# Patient Record
Sex: MALE | Race: WHITE | Employment: UNEMPLOYED | URBAN - METROPOLITAN AREA
[De-identification: names, ages, dates, MRNs, and addresses within clinical notes are randomized per-mention and may not be internally consistent; named-entity substitution may affect disease eponyms.]

---

## 2020-09-02 ENCOUNTER — HOSPITAL ENCOUNTER (OUTPATIENT)
Facility: CLINIC | Age: 45
Setting detail: OBSERVATION
Discharge: HOME | End: 2020-09-03
Attending: EMERGENCY MEDICINE | Admitting: EMERGENCY MEDICINE
Payer: COMMERCIAL

## 2020-09-02 DIAGNOSIS — L08.9 SKIN INFECTION: Primary | ICD-10-CM

## 2020-09-02 DIAGNOSIS — I89.1 LYMPHANGITIS: ICD-10-CM

## 2020-09-02 LAB
ANION GAP SERPL CALCULATED.3IONS-SCNC: 8 MMOL/L (ref 3–14)
BASOPHILS # BLD AUTO: 0 10E9/L (ref 0–0.2)
BASOPHILS NFR BLD AUTO: 0.5 %
BUN SERPL-MCNC: 12 MG/DL (ref 7–30)
CALCIUM SERPL-MCNC: 8.7 MG/DL (ref 8.5–10.1)
CHLORIDE SERPL-SCNC: 104 MMOL/L (ref 94–109)
CO2 SERPL-SCNC: 27 MMOL/L (ref 20–32)
CREAT SERPL-MCNC: 0.7 MG/DL (ref 0.66–1.25)
CRP SERPL-MCNC: 11 MG/L (ref 0–8)
DIFFERENTIAL METHOD BLD: ABNORMAL
EOSINOPHIL # BLD AUTO: 0 10E9/L (ref 0–0.7)
EOSINOPHIL NFR BLD AUTO: 0.4 %
ERYTHROCYTE [DISTWIDTH] IN BLOOD BY AUTOMATED COUNT: 13.6 % (ref 10–15)
ERYTHROCYTE [SEDIMENTATION RATE] IN BLOOD BY WESTERGREN METHOD: 34 MM/H (ref 0–15)
GFR SERPL CREATININE-BSD FRML MDRD: >90 ML/MIN/{1.73_M2}
GLUCOSE SERPL-MCNC: 97 MG/DL (ref 70–99)
HCT VFR BLD AUTO: 37.4 % (ref 40–53)
HGB BLD-MCNC: 12.6 G/DL (ref 13.3–17.7)
IMM GRANULOCYTES # BLD: 0 10E9/L (ref 0–0.4)
IMM GRANULOCYTES NFR BLD: 0.4 %
LYMPHOCYTES # BLD AUTO: 1.7 10E9/L (ref 0.8–5.3)
LYMPHOCYTES NFR BLD AUTO: 21.9 %
MAGNESIUM SERPL-MCNC: 2.2 MG/DL (ref 1.6–2.3)
MCH RBC QN AUTO: 31 PG (ref 26.5–33)
MCHC RBC AUTO-ENTMCNC: 33.7 G/DL (ref 31.5–36.5)
MCV RBC AUTO: 92 FL (ref 78–100)
MONOCYTES # BLD AUTO: 0.6 10E9/L (ref 0–1.3)
MONOCYTES NFR BLD AUTO: 7.7 %
NEUTROPHILS # BLD AUTO: 5.4 10E9/L (ref 1.6–8.3)
NEUTROPHILS NFR BLD AUTO: 69.1 %
NRBC # BLD AUTO: 0 10*3/UL
NRBC BLD AUTO-RTO: 0 /100
PLATELET # BLD AUTO: 259 10E9/L (ref 150–450)
POTASSIUM SERPL-SCNC: 3.3 MMOL/L (ref 3.4–5.3)
RBC # BLD AUTO: 4.06 10E12/L (ref 4.4–5.9)
SODIUM SERPL-SCNC: 139 MMOL/L (ref 133–144)
WBC # BLD AUTO: 7.8 10E9/L (ref 4–11)

## 2020-09-02 PROCEDURE — 99219 ZZC INITIAL OBSERVATION CARE,LEVL II: CPT | Mod: Z6 | Performed by: PHYSICIAN ASSISTANT

## 2020-09-02 PROCEDURE — 25800030 ZZH RX IP 258 OP 636: Performed by: EMERGENCY MEDICINE

## 2020-09-02 PROCEDURE — 99285 EMERGENCY DEPT VISIT HI MDM: CPT | Mod: 25 | Performed by: EMERGENCY MEDICINE

## 2020-09-02 PROCEDURE — U0003 INFECTIOUS AGENT DETECTION BY NUCLEIC ACID (DNA OR RNA); SEVERE ACUTE RESPIRATORY SYNDROME CORONAVIRUS 2 (SARS-COV-2) (CORONAVIRUS DISEASE [COVID-19]), AMPLIFIED PROBE TECHNIQUE, MAKING USE OF HIGH THROUGHPUT TECHNOLOGIES AS DESCRIBED BY CMS-2020-01-R: HCPCS | Performed by: EMERGENCY MEDICINE

## 2020-09-02 PROCEDURE — 25000128 H RX IP 250 OP 636: Performed by: EMERGENCY MEDICINE

## 2020-09-02 PROCEDURE — C9803 HOPD COVID-19 SPEC COLLECT: HCPCS | Performed by: EMERGENCY MEDICINE

## 2020-09-02 PROCEDURE — 85652 RBC SED RATE AUTOMATED: CPT | Performed by: EMERGENCY MEDICINE

## 2020-09-02 PROCEDURE — 87040 BLOOD CULTURE FOR BACTERIA: CPT | Mod: XS | Performed by: EMERGENCY MEDICINE

## 2020-09-02 PROCEDURE — 80048 BASIC METABOLIC PNL TOTAL CA: CPT | Performed by: EMERGENCY MEDICINE

## 2020-09-02 PROCEDURE — 25000132 ZZH RX MED GY IP 250 OP 250 PS 637: Performed by: NURSE PRACTITIONER

## 2020-09-02 PROCEDURE — G0378 HOSPITAL OBSERVATION PER HR: HCPCS

## 2020-09-02 PROCEDURE — 96365 THER/PROPH/DIAG IV INF INIT: CPT | Performed by: EMERGENCY MEDICINE

## 2020-09-02 PROCEDURE — 86140 C-REACTIVE PROTEIN: CPT | Performed by: EMERGENCY MEDICINE

## 2020-09-02 PROCEDURE — 83735 ASSAY OF MAGNESIUM: CPT | Performed by: EMERGENCY MEDICINE

## 2020-09-02 PROCEDURE — 96366 THER/PROPH/DIAG IV INF ADDON: CPT | Performed by: EMERGENCY MEDICINE

## 2020-09-02 PROCEDURE — 85025 COMPLETE CBC W/AUTO DIFF WBC: CPT | Performed by: EMERGENCY MEDICINE

## 2020-09-02 RX ORDER — POTASSIUM CHLORIDE 1.5 G/1.58G
20-40 POWDER, FOR SOLUTION ORAL
Status: DISCONTINUED | OUTPATIENT
Start: 2020-09-02 | End: 2020-09-03 | Stop reason: HOSPADM

## 2020-09-02 RX ORDER — BUPROPION HYDROCHLORIDE 150 MG/1
150 TABLET, EXTENDED RELEASE ORAL 2 TIMES DAILY
Status: DISCONTINUED | OUTPATIENT
Start: 2020-09-02 | End: 2020-09-02

## 2020-09-02 RX ORDER — ONDANSETRON 4 MG/1
4 TABLET, ORALLY DISINTEGRATING ORAL EVERY 6 HOURS PRN
Status: DISCONTINUED | OUTPATIENT
Start: 2020-09-02 | End: 2020-09-03 | Stop reason: HOSPADM

## 2020-09-02 RX ORDER — CEFAZOLIN SODIUM 1 G/50ML
1250 SOLUTION INTRAVENOUS ONCE
Status: COMPLETED | OUTPATIENT
Start: 2020-09-02 | End: 2020-09-02

## 2020-09-02 RX ORDER — POTASSIUM CHLORIDE 7.45 MG/ML
10 INJECTION INTRAVENOUS
Status: DISCONTINUED | OUTPATIENT
Start: 2020-09-02 | End: 2020-09-03 | Stop reason: HOSPADM

## 2020-09-02 RX ORDER — MAGNESIUM SULFATE HEPTAHYDRATE 40 MG/ML
4 INJECTION, SOLUTION INTRAVENOUS EVERY 4 HOURS PRN
Status: DISCONTINUED | OUTPATIENT
Start: 2020-09-02 | End: 2020-09-03 | Stop reason: HOSPADM

## 2020-09-02 RX ORDER — POTASSIUM CL/LIDO/0.9 % NACL 10MEQ/0.1L
10 INTRAVENOUS SOLUTION, PIGGYBACK (ML) INTRAVENOUS
Status: DISCONTINUED | OUTPATIENT
Start: 2020-09-02 | End: 2020-09-03 | Stop reason: HOSPADM

## 2020-09-02 RX ORDER — ACETAMINOPHEN 325 MG/1
650 TABLET ORAL EVERY 4 HOURS PRN
Status: DISCONTINUED | OUTPATIENT
Start: 2020-09-02 | End: 2020-09-03 | Stop reason: HOSPADM

## 2020-09-02 RX ORDER — POTASSIUM CHLORIDE 750 MG/1
20-40 TABLET, EXTENDED RELEASE ORAL
Status: DISCONTINUED | OUTPATIENT
Start: 2020-09-02 | End: 2020-09-03 | Stop reason: HOSPADM

## 2020-09-02 RX ORDER — ONDANSETRON 2 MG/ML
4 INJECTION INTRAMUSCULAR; INTRAVENOUS EVERY 6 HOURS PRN
Status: DISCONTINUED | OUTPATIENT
Start: 2020-09-02 | End: 2020-09-03 | Stop reason: HOSPADM

## 2020-09-02 RX ORDER — POTASSIUM CHLORIDE 29.8 MG/ML
20 INJECTION INTRAVENOUS
Status: DISCONTINUED | OUTPATIENT
Start: 2020-09-02 | End: 2020-09-03 | Stop reason: HOSPADM

## 2020-09-02 RX ORDER — NALOXONE HYDROCHLORIDE 0.4 MG/ML
.1-.4 INJECTION, SOLUTION INTRAMUSCULAR; INTRAVENOUS; SUBCUTANEOUS
Status: DISCONTINUED | OUTPATIENT
Start: 2020-09-02 | End: 2020-09-03 | Stop reason: HOSPADM

## 2020-09-02 RX ADMIN — POTASSIUM CHLORIDE 20 MEQ: 1.5 POWDER, FOR SOLUTION ORAL at 23:37

## 2020-09-02 RX ADMIN — VANCOMYCIN HYDROCHLORIDE 1250 MG: 1 INJECTION, POWDER, LYOPHILIZED, FOR SOLUTION INTRAVENOUS at 14:49

## 2020-09-02 ASSESSMENT — ENCOUNTER SYMPTOMS
SORE THROAT: 0
FEVER: 0
SHORTNESS OF BREATH: 0
COUGH: 0
WOUND: 1

## 2020-09-02 NOTE — PHARMACY-VANCOMYCIN DOSING SERVICE
Pharmacy Vancomycin Initial Note  Date of Service 2020  Patient's  1975  44 year old, male    Indication: Skin and Soft Tissue Infection    Current estimated CrCl = 151.2 ml/min    Creatinine for last 3 days  2020: 12:44 PM Creatinine 0.70 mg/dL    Recent Vancomycin Level(s) for last 3 days  No results found for requested labs within last 72 hours.      Vancomycin IV Administrations (past 72 hours)      No vancomycin orders with administrations in past 72 hours.                Nephrotoxins and other renal medications (From now, onward)    Start     Dose/Rate Route Frequency Ordered Stop    20 1425  vancomycin (VANCOCIN) 1,250 mg in sodium chloride 0.9 % 250 mL intermittent infusion      1,250 mg  over 90 Minutes Intravenous ONCE 20 1424            Contrast Orders - past 72 hours (72h ago, onward)    None                Plan:  1. Start vancomycin  1250 mg IV x1 in ED (~15.7 mg/kg).   2. Goal Trough Level: 10-15 mg/L   3.  Pharmacy will check trough levels as appropriate in 1-3 Days.    4. Serum creatinine levels will be ordered a minimum of twice weekly.    5. [place] method utilized to dose vancomycin therapy: Method 2    Harry Kemp, Isaac  [facility]  Emergency Department: Ascom *[id number]

## 2020-09-02 NOTE — ED NOTES
Report given to Mayela on Obs unit. Pt and family informed of Observation status. Pt vitally stable and ready for transfer.

## 2020-09-02 NOTE — ED NOTES
Observation Brochure and Video    Patient and family informed of observation status based on provider's order.  Observation brochure was given and the video watched. Patient/Family stated understanding. Questions answered.  Rebecca Fisher RN

## 2020-09-02 NOTE — ED PROVIDER NOTES
ED Provider Note  [facility]      History     Chief Complaint   Patient presents with     Hand Pain     Patient noticed a couple small bumps on his R hand 5 days ago, patient also noticed new swelling and redness on his Right hand 3 days ago , pateint concern for possible infection.      The history is provided by the patient and medical records.     Heber Wells is a 44 year old male with no significant past medical history who presents here to the Emergency Department for evaluation of hand pain.  Patient states he noticed a couple bumps on his right hand 5 days ago.  He notes the nodules are extremely painful and getting progressively worse.  Patient states the first nodule was on his right thumb.  He describes they progress with draining pus then crusting over.  He notes new swelling and redness on his right hand that goes up his forearm 3 days ago.  Patient reports additional painful nodules on his left hand.  He notes that each nodule started with a trauma/scratch. Patient notes a history of carpal tunnel syndrome.  He denies history of IV drug use, patient has no artificial valves or other cardiac history.  He states he was sick 2-3 weeks ago with myalgias.  Patient denies any COVID symptoms. Afebrile currently.  No joint stiffness or history of RA.       Review of Systems   Constitutional: Negative for fever.   HENT: Negative for sore throat.    Respiratory: Negative for cough and shortness of breath.    Cardiovascular: Negative for chest pain.   Skin: Positive for wound (painful nodules on digits and hand).     A complete review of systems was performed with pertinent positives and negatives noted in the HPI, and all other systems negative.    Physical Exam   BP: 127/79  Pulse: 93  Temp: 99  F (37.2  C)  Resp: 16  Weight: 79.4 kg (175 lb)  SpO2: 98 %  Physical Exam  General: awake, alert, NAD  Head: normal cephalic  HEENT: pupils equal, conjugate gaze in tact  Neck:  Supple  CV: regular rate and rhythm without murmur  Lungs: clear to ascultation  Abd: soft, non-tender, no guarding, no peritoneal signs  EXT: lower extremities without swelling or edema.    Neuro: awake, answers questions appropriately. No focal deficits noted   Skin: Nodule on L middle digit, several crusted nodules noted bilateral digits.  2 firm, tender, nodules with redness on right palm, streaks of erythema tracking up the forearm concerning for lymphangitis    ED Course     12:12 PM  The patient was seen and examined by Rudy Joseph MD in Room ED19.    Procedures       Results for orders placed or performed during the hospital encounter of 09/02/20   CBC with platelets differential     Status: Abnormal   Result Value Ref Range    WBC 7.8 4.0 - 11.0 10e9/L    RBC Count 4.06 (L) 4.4 - 5.9 10e12/L    Hemoglobin 12.6 (L) 13.3 - 17.7 g/dL    Hematocrit 37.4 (L) 40.0 - 53.0 %    MCV 92 78 - 100 fl    MCH 31.0 26.5 - 33.0 pg    MCHC 33.7 31.5 - 36.5 g/dL    RDW 13.6 10.0 - 15.0 %    Platelet Count 259 150 - 450 10e9/L    Diff Method Automated Method     % Neutrophils 69.1 %    % Lymphocytes 21.9 %    % Monocytes 7.7 %    % Eosinophils 0.4 %    % Basophils 0.5 %    % Immature Granulocytes 0.4 %    Nucleated RBCs 0 0 /100    Absolute Neutrophil 5.4 1.6 - 8.3 10e9/L    Absolute Lymphocytes 1.7 0.8 - 5.3 10e9/L    Absolute Monocytes 0.6 0.0 - 1.3 10e9/L    Absolute Eosinophils 0.0 0.0 - 0.7 10e9/L    Absolute Basophils 0.0 0.0 - 0.2 10e9/L    Abs Immature Granulocytes 0.0 0 - 0.4 10e9/L    Absolute Nucleated RBC 0.0    Basic metabolic panel     Status: Abnormal   Result Value Ref Range    Sodium 139 133 - 144 mmol/L    Potassium 3.3 (L) 3.4 - 5.3 mmol/L    Chloride 104 94 - 109 mmol/L    Carbon Dioxide 27 20 - 32 mmol/L    Anion Gap 8 3 - 14 mmol/L    Glucose 97 70 - 99 mg/dL    Urea Nitrogen 12 7 - 30 mg/dL    Creatinine 0.70 0.66 - 1.25 mg/dL    GFR Estimate >90 >60 mL/min/[1.73_m2]    GFR Estimate If Black >90 >60  mL/min/[1.73_m2]    Calcium 8.7 8.5 - 10.1 mg/dL   CRP inflammation     Status: Abnormal   Result Value Ref Range    CRP Inflammation 11.0 (H) 0.0 - 8.0 mg/L   Erythrocyte sedimentation rate auto     Status: Abnormal   Result Value Ref Range    Sed Rate 34 (H) 0 - 15 mm/h     Medications   vancomycin (VANCOCIN) 1,250 mg in sodium chloride 0.9 % 250 mL intermittent infusion (has no administration in time range)        Assessments & Plan (with Medical Decision Making)   Patient is awake, alert, nontoxic appearing.  Patient has some type of nodule skin condition on his bilateral hands, varying stages of healing.  These lesions do not appear to be necrotic or consistent with Janeway nodules.  Additionally patient does not have obvious risk factors for endocarditis.  The 2 nodules on his palmar aspect have overlying cracks in the skin with erythema streaking up the forearm, concerning for lymphangitic spread of an infection.  I suspect secondary infection and not infection is the underlying primary source.  Will obtain laboratory studies and blood cultures.    Laboratory studies notable for normal white count, no left shift.  Mildly elevated sed rate and ESR.  Given the concern for the lymphangitic spread I will give a dose of IV vancomycin, would anticipate patient can be transitioned over to doxycycline once the lymphangitis is no longer spreading.  Plan is to admit with ED observation, can obtain a dermatology consult and assess for improvement of lymphangitic spread.    I have reviewed the nursing notes. I have reviewed the findings, diagnosis, plan and need for follow up with the patient.    New Prescriptions    No medications on file       Final diagnoses:   Lymphangitis   IGabriela, am serving as a trained medical scribe to document services personally performed by Rudy Riley MD, based on the provider's statements to me.     IRudy MD, was physically present and have reviewed and verified  the accuracy of this note documented by Gabriela Rogers.     --  Rudy Joseph MD  [facility], [place], EMERGENCY DEPARTMENT  9/2/2020     Rudy Joseph MD  09/02/20 1423

## 2020-09-02 NOTE — PHARMACY-VANCOMYCIN DOSING SERVICE
Pharmacy Vancomycin Initial Note  Date of Service 2020  Patient's  1975  44 year old, male    Indication: Skin and Soft Tissue Infection    Current estimated CrCl = Estimated Creatinine Clearance: 151.2 mL/min (based on SCr of 0.7 mg/dL).    Creatinine for last 3 days  2020: 12:44 PM Creatinine 0.70 mg/dL    Recent Vancomycin Level(s) for last 3 days  No results found for requested labs within last 72 hours.      Vancomycin IV Administrations (past 72 hours)                   vancomycin (VANCOCIN) 1,250 mg in sodium chloride 0.9 % 250 mL intermittent infusion (mg) 1,250 mg New Bag 20 1449                Nephrotoxins and other renal medications (From now, onward)    Start     Dose/Rate Route Frequency Ordered Stop    20 0300  vancomycin 1250 mg in 0.9% NaCl 250 mL intermittent infusion 1,250 mg      1,250 mg  over 90 Minutes Intravenous EVERY 12 HOURS 20 1819            Contrast Orders - past 72 hours (72h ago, onward)    None                Plan:  1.  Start vancomycin  1250 mg IV q12h.   2.  Goal Trough Level: 10-15 mg/L   3.  Pharmacy will check trough levels as appropriate in 1-3 Days.    4. Serum creatinine levels will be ordered daily for the first week of therapy and at least twice weekly for subsequent weeks.    5. [place] method utilized to dose vancomycin therapy: Method 2

## 2020-09-02 NOTE — H&P
[facility] ED Observation Admission Note    Chief Complaint   Patient presents with     Hand Pain     Patient noticed a couple small bumps on his R hand 5 days ago, patient also noticed new swelling and redness on his Right hand 3 days ago , pateint concern for possible infection.        Assessment/Plan:  Heber Wells is a 44 year old male with no significant past medical history who presents here to the Emergency Department for evaluation of hand pain.    1. Skin infection: noticed 2 small bumps on his right thumb about 6 days ago. The following day, he noticed few more bumps on his right palm as well as few others on the left hand. He reports they drain pus then crust over. The nodules are very painful.  Now has red streaks going up to the right forearm. He also has associated swellin. Denies fever or chills. Denies history of IV drug use. In the ED,  VSS. LAB: BMP remarkable for low potassium ( K3.3) otherwise normal. No leukocytosis. HGB 12.6, at baseline. BC x 2 pending. COVID19 pending. Vancomycin was started in the ED. Patient admitted for IV antibiotic and dermatology consult.   -VS per routine  - ml/ hour  -Pharmacy to dose Vancomycin, can transition to Doxycycline per pharmacy  -Dermatology IP consult   -Follow Covid19  -Follow BC         HPI:    Heber Wells is a 44 year old male with no significant past medical history who presents here to the Emergency Department for evaluation of hand pain.  Patient states he noticed a couple bumps on his right hand 5 days ago.  He notes the nodules are extremely painful and getting progressively worse.  Patient states the first nodule was on his right thumb.  He describes they progress with draining pus then crusting over.  He notes new swelling and redness on his right hand that goes up his forearm 3 days ago.  Patient reports additional painful nodules on his left hand.  He notes that each nodule started with a trauma/scratch. Patient notes a history of  carpal tunnel syndrome.  He denies history of IV drug use, patient has no artificial valves or other cardiac history.  He states he was sick 2-3 weeks ago with myalgias.  Patient denies any COVID symptoms. Afebrile currently.  No joint stiffness or history of RA.        In the ED,  VSS. LAB: BMP remarkable for low potassium ( K3.3) otherwise normal. No leukocytosis. HGB 12.6, at baseline. BC x 2 pending. COVID19 pending. Vancomycin was started in the ED. Patient admitted for IV antibiotic and dermatology consult.   -VS per routine    On admission to the observation unit the patient was stable    History:    Past Medical History:   Diagnosis Date     Adjustment disorder with anxiety 2013     Moderate major depression (H) 2013       History reviewed. No pertinent surgical history.    Family History   Problem Relation Age of Onset     Diabetes Father      Cancer Father         Leukemia     Cancer Maternal Grandfather         prostate     Heart Disease Sister         barillas       Social History     Socioeconomic History     Marital status: Single     Spouse name: Not on file     Number of children: Not on file     Years of education: Not on file     Highest education level: Not on file   Occupational History     Not on file   Social Needs     Financial resource strain: Not on file     Food insecurity     Worry: Not on file     Inability: Not on file     Transportation needs     Medical: Not on file     Non-medical: Not on file   Tobacco Use     Smoking status: Former Smoker     Types: Cigarettes     Last attempt to quit: 2014     Years since quittin.3     Smokeless tobacco: Never Used   Substance and Sexual Activity     Alcohol use: No     Alcohol/week: 0.0 standard drinks     Drug use: Yes     Types: Marijuana     Comment: occasionally     Sexual activity: Never   Lifestyle     Physical activity     Days per week: Not on file     Minutes per session: Not on file     Stress: Not on file   Relationships      Social connections     Talks on phone: Not on file     Gets together: Not on file     Attends Christian service: Not on file     Active member of club or organization: Not on file     Attends meetings of clubs or organizations: Not on file     Relationship status: Not on file     Intimate partner violence     Fear of current or ex partner: Not on file     Emotionally abused: Not on file     Physically abused: Not on file     Forced sexual activity: Not on file   Other Topics Concern     Parent/sibling w/ CABG, MI or angioplasty before 65F 55M? No   Social History Narrative     Not on file       No current facility-administered medications on file prior to encounter.   buPROPion (WELLBUTRIN SR) 150 MG 12 hr tablet, Take 1 tablet once daily and increase to 1 tablet twice daily after 4 to 7 days  sildenafil (VIAGRA) 100 MG tablet, Take 0.5-1 tablets ( mg) by mouth daily as needed for erectile dysfunction Take 30 min to 4 hours before intercourse.  Never use with nitroglycerin, terazosin or doxazosin.  traMADol (ULTRAM) 50 MG tablet, Take 1 tablet (50 mg) by mouth every 6 hours as needed for moderate pain  traMADol (ULTRAM) 50 MG tablet, Take 1 tablet (50 mg) by mouth every 6 hours as needed for pain        Data:    Results for orders placed or performed during the hospital encounter of 09/02/20   CBC with platelets differential     Status: Abnormal   Result Value Ref Range    WBC 7.8 4.0 - 11.0 10e9/L    RBC Count 4.06 (L) 4.4 - 5.9 10e12/L    Hemoglobin 12.6 (L) 13.3 - 17.7 g/dL    Hematocrit 37.4 (L) 40.0 - 53.0 %    MCV 92 78 - 100 fl    MCH 31.0 26.5 - 33.0 pg    MCHC 33.7 31.5 - 36.5 g/dL    RDW 13.6 10.0 - 15.0 %    Platelet Count 259 150 - 450 10e9/L    Diff Method Automated Method     % Neutrophils 69.1 %    % Lymphocytes 21.9 %    % Monocytes 7.7 %    % Eosinophils 0.4 %    % Basophils 0.5 %    % Immature Granulocytes 0.4 %    Nucleated RBCs 0 0 /100    Absolute Neutrophil 5.4 1.6 - 8.3 10e9/L     Absolute Lymphocytes 1.7 0.8 - 5.3 10e9/L    Absolute Monocytes 0.6 0.0 - 1.3 10e9/L    Absolute Eosinophils 0.0 0.0 - 0.7 10e9/L    Absolute Basophils 0.0 0.0 - 0.2 10e9/L    Abs Immature Granulocytes 0.0 0 - 0.4 10e9/L    Absolute Nucleated RBC 0.0    Basic metabolic panel     Status: Abnormal   Result Value Ref Range    Sodium 139 133 - 144 mmol/L    Potassium 3.3 (L) 3.4 - 5.3 mmol/L    Chloride 104 94 - 109 mmol/L    Carbon Dioxide 27 20 - 32 mmol/L    Anion Gap 8 3 - 14 mmol/L    Glucose 97 70 - 99 mg/dL    Urea Nitrogen 12 7 - 30 mg/dL    Creatinine 0.70 0.66 - 1.25 mg/dL    GFR Estimate >90 >60 mL/min/[1.73_m2]    GFR Estimate If Black >90 >60 mL/min/[1.73_m2]    Calcium 8.7 8.5 - 10.1 mg/dL   CRP inflammation     Status: Abnormal   Result Value Ref Range    CRP Inflammation 11.0 (H) 0.0 - 8.0 mg/L   Erythrocyte sedimentation rate auto     Status: Abnormal   Result Value Ref Range    Sed Rate 34 (H) 0 - 15 mm/h             EKG Interpretation:          ROS:  REVIEW OF SYSTEMS:   General: fatigue   EYES: Negative for vision changes or eye problems   ENT: No problems with ears, nose or throat. No difficulty swallowing.   RESP: No coughing, wheezing or shortness of breath   CV: No chest pains or palpitations   GI: No nausea, vomiting, heartburn, abdominal pain, diarrhea, constipation or change in bowel habits   : No urinary frequency or dysuria, bladder or kidney problems   MUSCULOSKELETAL: No significant muscle or joint pains   NEUROLOGIC: No headaches, numbness, tingling, weakness, problems with balance or coordination   PSYCHIATRIC: No problems with anxiety, depression or mental health   HEME/IMMUNE/ALLERGY: No history of bleeding or clotting problems or anemia. No allergies or immune system problems   ENDOCRINE: No history of thyroid disease, diabetes or other endocrine disorders   SKIN: skin nodules       PCP: Jake Romero  CARDIAC RISK:    10 point ROS negative other than the symptoms noted  above.      Exam:    Vitals:  B/P: 136/101, T: 99.7, P: 87, R: 17    Physical Exam   Constitutional: Pt is oriented to person, place, and time.Pt appears well-developed and well-nourished.   HENT:   Head: Normocephalic and atraumatic.   Eyes: Conjunctivae are normal. Pupils are equal, round, and reactive to light.   Neck: Normal range of motion. Neck supple.   Cardiovascular: Normal rate, regular rhythm, normal heart sounds and intact distal pulses.    Pulmonary/Chest: Effort normal and breath sounds normal. No respiratory distress. Pt has no wheezes. Pt has no rales  Abdominal: Soft. Bowel sounds are normal. Pt exhibits no distension and no mass. No tenderness. Pt has no rebound and no guarding.   Musculoskeletal: Normal range of motion. Pt exhibits no edema.   Neurological: Pt is alert and oriented to person, place, and time. Normal reflexes.   Skin: 2 1cm nodules on the right palm. Tender to the touch. Right swelling and erythema present. Red streaks going to the right forearm.        Consults: Dermatology   FEN: Regular   DVT prophylaxis: Early ambulation  Code Status: Full  Disposition: Stable vital signs. Patient return to baseline.  All labs/images reviewed    Signed:  Monserrat Hurd PA-C  September 2, 2020 at 6:19 PM      Patient was seen and evaluated by ER Staff during the OBS Unit stay (see separate note).  The medical decision making and plan of care was discussed with the OBS Care Team and was supervised by ER Staff.  The documentation above accurately reflects the patient's evaluation, care and disposition under my supervision in the OBS Unit.    Zack Aldridge MD, FACEP  [facility] Staff Emergency Physician

## 2020-09-03 VITALS
TEMPERATURE: 99.7 F | BODY MASS INDEX: 22.46 KG/M2 | SYSTOLIC BLOOD PRESSURE: 141 MMHG | OXYGEN SATURATION: 100 % | DIASTOLIC BLOOD PRESSURE: 92 MMHG | RESPIRATION RATE: 18 BRPM | HEART RATE: 82 BPM | HEIGHT: 74 IN | WEIGHT: 175 LBS

## 2020-09-03 LAB
ANION GAP SERPL CALCULATED.3IONS-SCNC: 5 MMOL/L (ref 3–14)
BUN SERPL-MCNC: 8 MG/DL (ref 7–30)
CALCIUM SERPL-MCNC: 8.7 MG/DL (ref 8.5–10.1)
CHLORIDE SERPL-SCNC: 103 MMOL/L (ref 94–109)
CO2 SERPL-SCNC: 28 MMOL/L (ref 20–32)
CREAT SERPL-MCNC: 0.73 MG/DL (ref 0.66–1.25)
ERYTHROCYTE [DISTWIDTH] IN BLOOD BY AUTOMATED COUNT: 13.9 % (ref 10–15)
GFR SERPL CREATININE-BSD FRML MDRD: >90 ML/MIN/{1.73_M2}
GLUCOSE SERPL-MCNC: 101 MG/DL (ref 70–99)
GRAM STN SPEC: ABNORMAL
GRAM STN SPEC: ABNORMAL
HCT VFR BLD AUTO: 41.4 % (ref 40–53)
HGB BLD-MCNC: 13.9 G/DL (ref 13.3–17.7)
Lab: ABNORMAL
MCH RBC QN AUTO: 31 PG (ref 26.5–33)
MCHC RBC AUTO-ENTMCNC: 33.6 G/DL (ref 31.5–36.5)
MCV RBC AUTO: 92 FL (ref 78–100)
PLATELET # BLD AUTO: 232 10E9/L (ref 150–450)
POTASSIUM SERPL-SCNC: 3.9 MMOL/L (ref 3.4–5.3)
RBC # BLD AUTO: 4.48 10E12/L (ref 4.4–5.9)
SARS-COV-2 RNA SPEC QL NAA+PROBE: NOT DETECTED
SODIUM SERPL-SCNC: 136 MMOL/L (ref 133–144)
SPECIMEN SOURCE: ABNORMAL
SPECIMEN SOURCE: NORMAL
WBC # BLD AUTO: 6.9 10E9/L (ref 4–11)

## 2020-09-03 PROCEDURE — 85027 COMPLETE CBC AUTOMATED: CPT | Performed by: PHYSICIAN ASSISTANT

## 2020-09-03 PROCEDURE — 25000128 H RX IP 250 OP 636: Performed by: EMERGENCY MEDICINE

## 2020-09-03 PROCEDURE — 25000132 ZZH RX MED GY IP 250 OP 250 PS 637: Performed by: NURSE PRACTITIONER

## 2020-09-03 PROCEDURE — 87070 CULTURE OTHR SPECIMN AEROBIC: CPT | Performed by: DERMATOLOGY

## 2020-09-03 PROCEDURE — 80048 BASIC METABOLIC PNL TOTAL CA: CPT | Performed by: PHYSICIAN ASSISTANT

## 2020-09-03 PROCEDURE — 87205 SMEAR GRAM STAIN: CPT | Performed by: DERMATOLOGY

## 2020-09-03 PROCEDURE — G0378 HOSPITAL OBSERVATION PER HR: HCPCS

## 2020-09-03 PROCEDURE — 25000132 ZZH RX MED GY IP 250 OP 250 PS 637: Performed by: PHYSICIAN ASSISTANT

## 2020-09-03 PROCEDURE — 87077 CULTURE AEROBIC IDENTIFY: CPT | Performed by: DERMATOLOGY

## 2020-09-03 PROCEDURE — 99217 ZZC OBSERVATION CARE DISCHARGE: CPT | Mod: Z6 | Performed by: NURSE PRACTITIONER

## 2020-09-03 PROCEDURE — 25800030 ZZH RX IP 258 OP 636: Performed by: EMERGENCY MEDICINE

## 2020-09-03 PROCEDURE — 87186 SC STD MICRODIL/AGAR DIL: CPT | Performed by: DERMATOLOGY

## 2020-09-03 PROCEDURE — 96376 TX/PRO/DX INJ SAME DRUG ADON: CPT

## 2020-09-03 PROCEDURE — 36415 COLL VENOUS BLD VENIPUNCTURE: CPT | Performed by: PHYSICIAN ASSISTANT

## 2020-09-03 RX ORDER — DOXYCYCLINE 100 MG/1
100 CAPSULE ORAL 2 TIMES DAILY
Qty: 20 CAPSULE | Refills: 0 | Status: SHIPPED | OUTPATIENT
Start: 2020-09-03 | End: 2020-09-13

## 2020-09-03 RX ADMIN — ACETAMINOPHEN 650 MG: 325 TABLET, FILM COATED ORAL at 12:08

## 2020-09-03 RX ADMIN — VANCOMYCIN HYDROCHLORIDE 1250 MG: 10 INJECTION, POWDER, LYOPHILIZED, FOR SOLUTION INTRAVENOUS at 14:30

## 2020-09-03 RX ADMIN — POTASSIUM CHLORIDE 20 MEQ: 1.5 POWDER, FOR SOLUTION ORAL at 03:00

## 2020-09-03 RX ADMIN — VANCOMYCIN HYDROCHLORIDE 1250 MG: 10 INJECTION, POWDER, LYOPHILIZED, FOR SOLUTION INTRAVENOUS at 03:01

## 2020-09-03 NOTE — PROGRESS NOTES
-diagnostic tests and consults completed and resulted : not met   -vital signs normal or at patient baseline : No, pt febrile  -tolerating oral antibiotics or has plans for home infusion setup : No, still on IV antibitoics  -infection is improving : in progress   -returns to baseline functional status : met   -safe disposition plan has been identified : in progress

## 2020-09-03 NOTE — PROGRESS NOTES
[facility]    Received referral received from SHANNON Alvarez for IV abx (currently vanco Q12).     Benefits verified.Pt has [employer] PMAP and is covered at 100%.    Spoke with patient via phone to review home infusion services, review benefits and offer choice of providers.     Patient would like to use [facility] for home infusion if needed.    [facility] Liaison will follow along.    Thank you for the referral.     Yany Harrison. RN BSN PHN [place]  [facility]  [phone] Office  [phone] Fax  [phone] (Nurse Triage M-F 8-5)

## 2020-09-03 NOTE — CONSULTS
[facility] Inpatient Consult Dermatology Note    Date of Admission: 9/2/2020  Encounter Date: 09/03/20  Consult Date: 09/03/20     Reason for Consultation:   Infected nodules on arms bilaterally    Assessment/Recommendations:  1. Purulent pustules, bilateral hands- favor bacterial ecthyma with early associated lymphangiitis. Suspect patient had evolving lymphangitis upon arrival to ED as well, which is now improved. He is improving rapidly on IV vancomycin. Patient otherwise is feeling well and states that pain has improved.  - Wound gram stain and culture performed of pustule on right palm.  - Per dermatology standpoint, okay to discharge with PO doxycycline 100 mg BID x 10 days  - Dermatology will schedule outpatient follow-up/follow-up wound culture and gram stain.    Thank you for the dermatology consultation. Will see patient virtually in clinic next week to ensure he is improving.    Patient seen and evaluated with attending physician, Dr. Fischer. Patient insisted on leaving ED prior to being evaluated by attending, and therefore Dr. Fischer reviewed the photos and agreed with the assessment and plan.    Earnest Dave MD  Dermatology Resident, PGY-3    I reviewed all avialable images and relevant chart information and agree with the assessment and plan as documented in the resident's note.    Salty Fischer MD  Dermatology Attending      ________________________________    History of Present Illness:    Mr. Wells is a 44 year old otherwise healthy male who was admitted to the observation unit for concerns of lymphangitis. The patient states that over the past 5-6 days, a few cuts in his right hand, on the right thumb and palm of the hand, have developed into red tender bumps and have begun draining pus and crusting over. Prior to presenting to the ED, he began noticing a red streak up his arm, though this has since resolved with IV vancomycin therapy. He states that the red streak was  "coming directly from the pustule in the middle of his right palm. He was treating with triple antibiotic ointment, soap and water, and hand  at home.    Mr. Wells states that he is otherwise healthy; he denies any history of IV drug use of cardiac concerns. He experienced a similar infected nodule on his left 3rd digit about one month ago, which he states resolved on its own, though denies any similar nodules prior to one month. 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 Is not around any pets/putting his hands in fish tanks or working in the soil. Reports experiencing fatigue and myalgias three weeks ago for 2-3 days in duration, though otherwise has been feeling well. No fevers, chills, chest pain, abdominal pain, shortness of breath, or further complaints or concerns at this time.    Review of Systems:  ROS negative except as listed in HPI.    Physical Exam:  Vitals: BP (!) 133/95 (BP Location: Right arm)   Pulse 79   Temp 99.5  F (37.5  C) (Oral)   Resp 16   Ht 1.88 m (6' 2\")   Wt 79.4 kg (175 lb)   SpO2 98%   BMI 22.47 kg/m    GEN: This is a well developed, well-nourished male in no acute distress, in a pleasant mood.    SKIN: Skin examined including the head, neck, bilateral arms, bilateral legs, digits, nails.  -Right palm with 1 cm purulent nodule, no evidence of red streaking on the right forearm.  -Left palmar distal middle finger with tender subcutaneous nodule  -Left dorsal hand -middle finger overlying PIP there is a tender erythematous papule  -Left dorsal hand overlying pointer finger DIP  there is an erythematous nodule  -Forearms clear                      Past Medical History:   Patient Active Problem List   Diagnosis     Sleep disturbance     CARDIOVASCULAR SCREENING; LDL GOAL LESS THAN 160     Chronic low back pain     Major depressive disorder, recurrent episode, moderate " (H)     Skin infection     Social History:  Patient reports that he quit smoking about 6 years ago. His smoking use included cigarettes. He has never used smokeless tobacco. He reports current drug use. Drug: Marijuana. He reports that he does not drink alcohol.  Patient denies IV drug use.    Family History:  Family History   Problem Relation Age of Onset     Diabetes Father      Cancer Father         Leukemia     Cancer Maternal Grandfather         prostate     Heart Disease Sister         barillas   No family history of skin conditions.    Medications:  Current Facility-Administered Medications   Medication     acetaminophen (TYLENOL) tablet 650 mg     magnesium sulfate 4 g in 100 mL sterile water (premade)     melatonin tablet 1 mg     naloxone (NARCAN) injection 0.1-0.4 mg     ondansetron (ZOFRAN-ODT) ODT tab 4 mg    Or     ondansetron (ZOFRAN) injection 4 mg     potassium chloride (KLOR-CON) Packet 20-40 mEq     potassium chloride 10 mEq in 100 mL intermittent infusion with 10 mg lidocaine     potassium chloride 10 mEq in 100 mL sterile water intermittent infusion (premix)     potassium chloride 20 mEq in 50 mL intermittent infusion     potassium chloride ER (KLOR-CON M) CR tablet 20-40 mEq     vancomycin 1250 mg in 0.9% NaCl 250 mL intermittent infusion 1,250 mg     No Known Allergies    Laboratory:  Results for orders placed or performed during the hospital encounter of 09/02/20 (from the past 24 hour(s))   CBC with platelets differential   Result Value Ref Range    WBC 7.8 4.0 - 11.0 10e9/L    RBC Count 4.06 (L) 4.4 - 5.9 10e12/L    Hemoglobin 12.6 (L) 13.3 - 17.7 g/dL    Hematocrit 37.4 (L) 40.0 - 53.0 %    MCV 92 78 - 100 fl    MCH 31.0 26.5 - 33.0 pg    MCHC 33.7 31.5 - 36.5 g/dL    RDW 13.6 10.0 - 15.0 %    Platelet Count 259 150 - 450 10e9/L    Diff Method Automated Method     % Neutrophils 69.1 %    % Lymphocytes 21.9 %    % Monocytes 7.7 %    % Eosinophils 0.4 %    % Basophils 0.5 %    % Immature  Granulocytes 0.4 %    Nucleated RBCs 0 0 /100    Absolute Neutrophil 5.4 1.6 - 8.3 10e9/L    Absolute Lymphocytes 1.7 0.8 - 5.3 10e9/L    Absolute Monocytes 0.6 0.0 - 1.3 10e9/L    Absolute Eosinophils 0.0 0.0 - 0.7 10e9/L    Absolute Basophils 0.0 0.0 - 0.2 10e9/L    Abs Immature Granulocytes 0.0 0 - 0.4 10e9/L    Absolute Nucleated RBC 0.0    Basic metabolic panel   Result Value Ref Range    Sodium 139 133 - 144 mmol/L    Potassium 3.3 (L) 3.4 - 5.3 mmol/L    Chloride 104 94 - 109 mmol/L    Carbon Dioxide 27 20 - 32 mmol/L    Anion Gap 8 3 - 14 mmol/L    Glucose 97 70 - 99 mg/dL    Urea Nitrogen 12 7 - 30 mg/dL    Creatinine 0.70 0.66 - 1.25 mg/dL    GFR Estimate >90 >60 mL/min/[1.73_m2]    GFR Estimate If Black >90 >60 mL/min/[1.73_m2]    Calcium 8.7 8.5 - 10.1 mg/dL   CRP inflammation   Result Value Ref Range    CRP Inflammation 11.0 (H) 0.0 - 8.0 mg/L   Erythrocyte sedimentation rate auto   Result Value Ref Range    Sed Rate 34 (H) 0 - 15 mm/h   Blood culture    Specimen: Hand, Right; Blood    Left Arm   Result Value Ref Range    Specimen Description Blood Left Arm     Culture Micro No growth after 15 hours    Magnesium   Result Value Ref Range    Magnesium 2.2 1.6 - 2.3 mg/dL   Blood culture    Specimen: Hand, Right; Blood    Right Hand   Result Value Ref Range    Specimen Description Blood Right Hand     Culture Micro No growth after 15 hours    CBC with platelets   Result Value Ref Range    WBC 6.9 4.0 - 11.0 10e9/L    RBC Count 4.48 4.4 - 5.9 10e12/L    Hemoglobin 13.9 13.3 - 17.7 g/dL    Hematocrit 41.4 40.0 - 53.0 %    MCV 92 78 - 100 fl    MCH 31.0 26.5 - 33.0 pg    MCHC 33.6 31.5 - 36.5 g/dL    RDW 13.9 10.0 - 15.0 %    Platelet Count 232 150 - 450 10e9/L   Basic metabolic panel   Result Value Ref Range    Sodium 136 133 - 144 mmol/L    Potassium 3.9 3.4 - 5.3 mmol/L    Chloride 103 94 - 109 mmol/L    Carbon Dioxide 28 20 - 32 mmol/L    Anion Gap 5 3 - 14 mmol/L    Glucose 101 (H) 70 - 99 mg/dL    Urea  Nitrogen 8 7 - 30 mg/dL    Creatinine 0.73 0.66 - 1.25 mg/dL    GFR Estimate >90 >60 mL/min/[1.73_m2]    GFR Estimate If Black >90 >60 mL/min/[1.73_m2]    Calcium 8.7 8.5 - 10.1 mg/dL     Staff Involved:  Resident/Staff

## 2020-09-03 NOTE — PROGRESS NOTES
Observation goals  PRIOR TO DISCHARGE      Comments:         -diagnostic tests and consults completed and resulted : not met   -vital signs normal or at patient baseline : met   -tolerating oral antibiotics or has plans for home infusion setup : No, still on IV antibitoics  -infection is improving : in progress   -returns to baseline functional status : met   -safe disposition plan has been identified : in progress

## 2020-09-03 NOTE — PROGRESS NOTES
Care Coordinator Progress Note    Admission Date/Time:  9/2/2020  Attending MD:  Dr Zack Aldridge    Observation Status    Data  Chart reviewed, discussed with interdisciplinary team. Informed by ARISTIDES Gregg, pt may need IV antibiotics, plan to be determined yet.   Currently on IV Vanco every 12 hours.     Patient was admitted for: Lymphangitis.   Pt presented to the ER with hand pain, redness & swelling; bumps on right hand were draining pus then crusted over.   Dermatology consulted.     Concerns with insurance coverage for discharge needs: None. Pt's insurance is [employer] [place].    Coordination of Care and Referrals  Chart reviewed. Derm consult pending. Referral sent to [facility] and the PANDA team for potential IV antibiotics after discharge.      Addendum 1:30pm: Informed by [facility] of pt's insurance coverage: [facility] plan, will have 100% coverage for IV abx.     Assessment  Diagnosis & treatment plan in progress. Has insurance coverage for home IV antibiotics.     Plan  Anticipated Discharge Date:  TBD    Anticipated Discharge Plan:   Anticipate discharge home when medically stable.  --RNCC will continue to follow for plan of care and any discharge needs. If IV antibiotics are needed at discharge pt will need PLC for IV teaching and RNCC will help coordinate discharge with [facility].          Kim Melchor RN Care Coordinator  [address], [facility]

## 2020-09-03 NOTE — DISCHARGE SUMMARY
Discharge Summary    Heber Wells MRN# [id number]   YOB: 1975 Age: 44 year old     Date of Admission:  9/2/2020  Date of Discharge:  9/3/2020  Admitting Physician:  Zack Aldridge MD  Discharge Physician:  Dr. Musa  Discharging Service:  Emergency Medicine     Primary Provider: Jake Romero          Discharge Diagnosis:     Skin infection    * No resolved hospital problems. *               Discharge Disposition:   Discharged to home           Condition on Discharge:   Discharge condition: Stable   Code status on discharge: Full Code           Procedures:   No procedures performed during this admission          Discharge Medications:     Current Discharge Medication List      START taking these medications    Details   doxycycline hyclate (VIBRAMYCIN) 100 MG capsule Take 1 capsule (100 mg) by mouth 2 times daily for 10 days  Qty: 20 capsule, Refills: 0    Associated Diagnoses: Lymphangitis         CONTINUE these medications which have NOT CHANGED    Details   buPROPion (WELLBUTRIN SR) 150 MG 12 hr tablet Take 1 tablet once daily and increase to 1 tablet twice daily after 4 to 7 days  Qty: 60 tablet, Refills: 2    Associated Diagnoses: Major depressive disorder, recurrent episode, moderate (H)      sildenafil (VIAGRA) 100 MG tablet Take 0.5-1 tablets ( mg) by mouth daily as needed for erectile dysfunction Take 30 min to 4 hours before intercourse.  Never use with nitroglycerin, terazosin or doxazosin.  Qty: 12 tablet, Refills: 11    Associated Diagnoses: Drug-induced erectile dysfunction      traMADol (ULTRAM) 50 MG tablet Take 1 tablet (50 mg) by mouth every 6 hours as needed for pain  Qty: 80 tablet, Refills: 0    Associated Diagnoses: Chronic low back pain                   Consultations:   Consultation during this admission received from dermatology             Brief History of Illness:   Please see detailed H&P from 9/2/2020, in brief: Heber Wells is a 44 year old  "male with no significant past medical history who presents here to the Emergency Department for evaluation of hand pain.          Hospital Course:   1. cellulitis with lymphangitis: noticed 2 small bumps on his right thumb about 6 days ago. The following day, he noticed few more bumps on his right palm as well as few others on the left hand. He reports they drain pus then crust over. The nodules are very painful.  Now has red streaks going up to the right forearm. He also has associated swellin. Denies fever or chills. Denies history of IV drug use. In the ED,  VSS. LAB: BMP remarkable for low potassium ( K3.3) otherwise normal. No leukocytosis. HGB 12.6, at baseline. BC x 2 pending. COVID19 pending. Vancomycin was started in the ED. Patient admitted for IV antibiotic and dermatology consult. He received two doses of Iv Vancomycin, lymphangitis and erythema improved.  VSS, mild fever at 99.7F.  Blood cultures with no growth to date.  Dermatology was consulted and wound culture was sent.  They recommend discharge on 10 day course of doxycycline.  They will arrange a phone follow up appointment.  Patient discharged to home in stable condition, when to return to the ED reviewed with the patient and he was in agreement.              Final Day of Progress before Discharge:       Physical Exam:  Blood pressure (!) 141/92, pulse 82, temperature 99.7  F (37.6  C), temperature source Oral, resp. rate 18, height 1.88 m (6' 2\"), weight 79.4 kg (175 lb), SpO2 100 %.    EXAM:  Exam:  Constitutional: healthy, alert and no distress  Cardiovascular: No lifts, heaves, or thrills. RRR. No murmurs, clicks gallops or rub  Respiratory: Good diaphragmatic excursion. Lungs clear  Gastrointestinal: Abdomen soft, non-tender. BS normal. No masses, organomegaly  Musculoskeletal: extremities normal- no gross deformities noted, gait normal and normal muscle tone  Skin: no suspicious lesions or rashes, right 1st digit and palm with erythematous " "round nodules, palm with purulent drainage and dorsal 1st digit with crusting. Distal left 3rd digit with scarred nodule.   Neurologic: Gait normal. Alert and oriented.   Psychiatric: mentation appears normal and affect normal/bright    BP (!) 141/92 (BP Location: Right arm)   Pulse 82   Temp 99.7  F (37.6  C) (Oral)   Resp 18   Ht 1.88 m (6' 2\")   Wt 79.4 kg (175 lb)   SpO2 100%   BMI 22.47 kg/m               Data:  All laboratory data reviewed             Significant Results:     Results for orders placed or performed during the hospital encounter of 09/02/20   CBC with platelets differential     Status: Abnormal   Result Value Ref Range    WBC 7.8 4.0 - 11.0 10e9/L    RBC Count 4.06 (L) 4.4 - 5.9 10e12/L    Hemoglobin 12.6 (L) 13.3 - 17.7 g/dL    Hematocrit 37.4 (L) 40.0 - 53.0 %    MCV 92 78 - 100 fl    MCH 31.0 26.5 - 33.0 pg    MCHC 33.7 31.5 - 36.5 g/dL    RDW 13.6 10.0 - 15.0 %    Platelet Count 259 150 - 450 10e9/L    Diff Method Automated Method     % Neutrophils 69.1 %    % Lymphocytes 21.9 %    % Monocytes 7.7 %    % Eosinophils 0.4 %    % Basophils 0.5 %    % Immature Granulocytes 0.4 %    Nucleated RBCs 0 0 /100    Absolute Neutrophil 5.4 1.6 - 8.3 10e9/L    Absolute Lymphocytes 1.7 0.8 - 5.3 10e9/L    Absolute Monocytes 0.6 0.0 - 1.3 10e9/L    Absolute Eosinophils 0.0 0.0 - 0.7 10e9/L    Absolute Basophils 0.0 0.0 - 0.2 10e9/L    Abs Immature Granulocytes 0.0 0 - 0.4 10e9/L    Absolute Nucleated RBC 0.0    Basic metabolic panel     Status: Abnormal   Result Value Ref Range    Sodium 139 133 - 144 mmol/L    Potassium 3.3 (L) 3.4 - 5.3 mmol/L    Chloride 104 94 - 109 mmol/L    Carbon Dioxide 27 20 - 32 mmol/L    Anion Gap 8 3 - 14 mmol/L    Glucose 97 70 - 99 mg/dL    Urea Nitrogen 12 7 - 30 mg/dL    Creatinine 0.70 0.66 - 1.25 mg/dL    GFR Estimate >90 >60 mL/min/[1.73_m2]    GFR Estimate If Black >90 >60 mL/min/[1.73_m2]    Calcium 8.7 8.5 - 10.1 mg/dL   CRP inflammation     Status: Abnormal "   Result Value Ref Range    CRP Inflammation 11.0 (H) 0.0 - 8.0 mg/L   Erythrocyte sedimentation rate auto     Status: Abnormal   Result Value Ref Range    Sed Rate 34 (H) 0 - 15 mm/h   Magnesium     Status: None   Result Value Ref Range    Magnesium 2.2 1.6 - 2.3 mg/dL   CBC with platelets     Status: None   Result Value Ref Range    WBC 6.9 4.0 - 11.0 10e9/L    RBC Count 4.48 4.4 - 5.9 10e12/L    Hemoglobin 13.9 13.3 - 17.7 g/dL    Hematocrit 41.4 40.0 - 53.0 %    MCV 92 78 - 100 fl    MCH 31.0 26.5 - 33.0 pg    MCHC 33.6 31.5 - 36.5 g/dL    RDW 13.9 10.0 - 15.0 %    Platelet Count 232 150 - 450 10e9/L   Basic metabolic panel     Status: Abnormal   Result Value Ref Range    Sodium 136 133 - 144 mmol/L    Potassium 3.9 3.4 - 5.3 mmol/L    Chloride 103 94 - 109 mmol/L    Carbon Dioxide 28 20 - 32 mmol/L    Anion Gap 5 3 - 14 mmol/L    Glucose 101 (H) 70 - 99 mg/dL    Urea Nitrogen 8 7 - 30 mg/dL    Creatinine 0.73 0.66 - 1.25 mg/dL    GFR Estimate >90 >60 mL/min/[1.73_m2]    GFR Estimate If Black >90 >60 mL/min/[1.73_m2]    Calcium 8.7 8.5 - 10.1 mg/dL   Blood culture     Status: None (Preliminary result)    Specimen: Hand, Right; Blood    Left Arm   Result Value Ref Range    Specimen Description Blood Left Arm     Culture Micro No growth after 21 hours    Blood culture     Status: None (Preliminary result)    Specimen: Hand, Right; Blood    Right Hand   Result Value Ref Range    Specimen Description Blood Right Hand     Culture Micro No growth after 21 hours       No results found for this or any previous visit (from the past 48 hour(s)).             Pending Results:   Unresulted Labs Ordered in the Past 30 Days of this Admission     Date and Time Order Name Status Description    9/3/2020 1452 Gram stain In process     9/3/2020 1452 Wound Culture Aerobic Bacterial In process     9/2/2020 1649 Asymptomatic COVID-19 Virus (Coronavirus) by PCR In process     9/2/2020 1408 Blood culture Preliminary     9/2/2020 1408  Blood culture Preliminary                   Discharge Instructions and Follow-Up:     Discharge Procedure Orders   Reason for your hospital stay   Order Comments: You were admitted to the observation unit for skin infection and nodules on your hands.  You were given IV antibiotics with improvement.  Dermatology was consulted and a culture of the fluid from your wound was sent to the lab.  Dermatology would like you to take antibiotics for 10 day course.  They will call you for a follow up phone appointment.     Adult [facility] Follow-up and recommended labs and tests   Order Comments: Follow up with primary care provider, dermatology within 7-10 days for hospital follow- up- telephone visit.    Appointments on [place] and/or [facility] (with [facility] or [facility] provider or service). Call [phone] if you haven't heard regarding these appointments within 7 days of discharge.     Activity   Order Comments: Your activity upon discharge: activity as tolerated     Order Specific Question Answer Comments   Is discharge order? Yes      When to contact your care team   Order Comments: Return to the ER if you have worsening redness, worsening swelling, worsening pain, fever.     Diet   Order Comments: Follow this diet upon discharge: Orders Placed This Encounter      Regular Diet Adult     Order Specific Question Answer Comments   Is discharge order? Yes           Attestation:  NICO Andrews CNP.

## 2020-09-03 NOTE — PLAN OF CARE
Observation goals  PRIOR TO DISCHARGE      Comments:         -diagnostic tests and consults completed and resulted : not met   -vital signs normal or at patient baseline : met   -tolerating oral antibiotics or has plans for home infusion setup : met   -infection is improving : in progress   -returns to baseline functional status : met   -safe disposition plan has been identified : in progress             Oral potassium given. Potassium level 3.3

## 2020-09-03 NOTE — PROGRESS NOTES
Discharge instruction reviewed.  Patient verbalized understanding. PIV removed, new medications reviewed, patient ambulated to the main lobby with family. Patient discharged

## 2020-09-03 NOTE — PROGRESS NOTES
Observation goals  PRIOR TO DISCHARGE      Comments: -diagnostic tests and consults completed and resulted : No    -vital signs normal or at patient baseline : Yes    -tolerating oral antibiotics or has plans for home infusion setup:Pending     -infection is improving: progressing    -returns to baseline functional status: Progressing    -safe disposition plan has been identified Pending    Nurse to notify provider when observation goals have been met and patient is ready for discharge.

## 2020-09-03 NOTE — PLAN OF CARE
Observation goals  PRIOR TO DISCHARGE      Comments:       -diagnostic tests and consults completed and resulted : not met   -vital signs normal or at patient baseline : met   -tolerating oral antibiotics or has plans for home infusion setup : in progress   -infection is improving : not met   -returns to baseline functional status : met   -safe disposition plan has been identified : in progress

## 2020-09-04 ENCOUNTER — TELEPHONE (OUTPATIENT)
Dept: DERMATOLOGY | Facility: CLINIC | Age: 45
End: 2020-09-04

## 2020-09-04 NOTE — TELEPHONE ENCOUNTER
----- Message from Earnest Dave MD sent at 9/3/2020  3:58 PM CDT -----  Regarding: ED follow-up  Hi guys! Can you schedule this patient for a phone and photo ED follow-up in an open slot next week? Possibly the 12:55 HFU slot with Brittney on 9/8?  Thanks so much. -Earnest

## 2020-09-05 LAB
BACTERIA SPEC CULT: ABNORMAL
BACTERIA SPEC CULT: ABNORMAL
Lab: ABNORMAL
SPECIMEN SOURCE: ABNORMAL

## 2020-09-08 LAB
BACTERIA SPEC CULT: NO GROWTH
BACTERIA SPEC CULT: NO GROWTH
SPECIMEN SOURCE: NORMAL
SPECIMEN SOURCE: NORMAL

## 2021-01-21 ENCOUNTER — HOSPITAL ENCOUNTER (EMERGENCY)
Facility: CLINIC | Age: 46
Discharge: HOME | End: 2021-01-21
Attending: FAMILY MEDICINE | Admitting: FAMILY MEDICINE
Payer: COMMERCIAL

## 2021-01-21 VITALS
DIASTOLIC BLOOD PRESSURE: 97 MMHG | TEMPERATURE: 98.2 F | RESPIRATION RATE: 18 BRPM | OXYGEN SATURATION: 98 % | SYSTOLIC BLOOD PRESSURE: 147 MMHG | HEART RATE: 84 BPM

## 2021-01-21 DIAGNOSIS — L25.9 CONTACT DERMATITIS, UNSPECIFIED CONTACT DERMATITIS TYPE, UNSPECIFIED TRIGGER: ICD-10-CM

## 2021-01-21 PROCEDURE — 250N000013 HC RX MED GY IP 250 OP 250 PS 637: Performed by: FAMILY MEDICINE

## 2021-01-21 PROCEDURE — 99283 EMERGENCY DEPT VISIT LOW MDM: CPT | Performed by: FAMILY MEDICINE

## 2021-01-21 PROCEDURE — 99284 EMERGENCY DEPT VISIT MOD MDM: CPT | Performed by: FAMILY MEDICINE

## 2021-01-21 RX ORDER — OXYCODONE AND ACETAMINOPHEN 5; 325 MG/1; MG/1
1-2 TABLET ORAL EVERY 4 HOURS PRN
Qty: 8 TABLET | Refills: 0 | Status: SHIPPED | OUTPATIENT
Start: 2021-01-21

## 2021-01-21 RX ORDER — OXYCODONE AND ACETAMINOPHEN 5; 325 MG/1; MG/1
2 TABLET ORAL ONCE
Status: COMPLETED | OUTPATIENT
Start: 2021-01-21 | End: 2021-01-21

## 2021-01-21 RX ORDER — CEPHALEXIN 500 MG/1
500 CAPSULE ORAL 4 TIMES DAILY
Qty: 28 CAPSULE | Refills: 0 | Status: SHIPPED | OUTPATIENT
Start: 2021-01-21 | End: 2021-01-28

## 2021-01-21 RX ADMIN — OXYCODONE HYDROCHLORIDE AND ACETAMINOPHEN 2 TABLET: 5; 325 TABLET ORAL at 17:34

## 2021-01-21 NOTE — DISCHARGE INSTRUCTIONS
Discharged home with as well as some pain medications with plan to use Aquaphor or Vaseline and sits baths as discussed following up with your primary MD next week.  Return if marked increase in redness swelling or drainage.

## 2021-01-21 NOTE — CONSULTS
"Urology Consult    Name: Heber Wells    MRN: [id number]   YOB: 1975               Chief Complaint:   Penile wounds    History is obtained from the patient and chart review          History of Present Illness:   Heber Wells is a 45 year old male with no significant PMH who present with open wounds on his penis and scrotum after using hair conditioner as lubrication for sex about a week ago. He did not realize that it was conditioner and tried to wash it off afterwards. He also had a rash on his arms and hands which has since resolved however the rash on his penis has gotten worse and now he is bleeding and cannot tolerate the pain. He has not taken any pain meds at home other than a couple of ibuprofen this morning. He came in today because of the pain and it is now painful for him to walk. He denies fever, urinary symptoms. He is taking \"tepid showers\", no baths.           Past Medical History:     Past Medical History:   Diagnosis Date     Adjustment disorder with anxiety 2013     Moderate major depression (H) 2013            Past Surgical History:   History reviewed. No pertinent surgical history.         Social History:     Social History     Tobacco Use     Smoking status: Former Smoker     Types: Cigarettes     Quit date: 2014     Years since quittin.6     Smokeless tobacco: Never Used   Substance Use Topics     Alcohol use: No     Alcohol/week: 0.0 standard drinks            Family History:     Family History   Problem Relation Age of Onset     Diabetes Father      Cancer Father         Leukemia     Cancer Maternal Grandfather         prostate     Heart Disease Sister         barillas            Allergies:   No Known Allergies         Medications:     No current facility-administered medications for this encounter.      Current Outpatient Medications   Medication Sig     buPROPion (WELLBUTRIN SR) 150 MG 12 hr tablet Take 1 tablet once daily and increase to 1 tablet twice " daily after 4 to 7 days     sildenafil (VIAGRA) 100 MG tablet Take 0.5-1 tablets ( mg) by mouth daily as needed for erectile dysfunction Take 30 min to 4 hours before intercourse.  Never use with nitroglycerin, terazosin or doxazosin.     traMADol (ULTRAM) 50 MG tablet Take 1 tablet (50 mg) by mouth every 6 hours as needed for pain             Review of Systems:    ROS: 10 point ROS neg other than the symptoms noted above in the HPI           Physical Exam:   VS:  T: 98.4    HR: 101    BP: 142/92    RR: 16   GEN:  AOx3.  NAD.    CV:  RRR  LUNGS: Non-labored breathing.   BACK:  No midline or CVA tenderness.  ABD:  Soft.  NT.  ND.  No rebound or guarding.  No masses.  :  Circumcised. Shallow ulcers located along shaft and one on the glans with slow bleeding. Assorted scabs along penis and scrotum. Anterior scrotum is somewhat indurated but without evidence of purulence and not fluctuant  EXT:  Warm, well perfused.   SKIN:  Warm.  Dry.  No rashes.  NEURO:  CN grossly intact.            Data:   All laboratory data reviewed:    No lab results found in last 7 days.  No lab results found in last 7 days.  No lab results found in last 7 days.    Invalid input(s): URINEBLOOD         Impression and Plan:   Impression:   Heber Wells is a 45 year old male here with penile lesions after accidentally using hair conditioner during sex. This is likely an allergic contact dermatitis. There is low suspicion for penile or scrotal abscess at this point given exam.    -   - recommend 7-10 day course of antibiotics given open wounds    - recommended that patient use aquaphor or vaseline on the penis and scrotum as a protective barrier against his clothes    - patient should do sitz baths twice per day until he sees improvement    - patient should follow up with his PCP within a week     This patient's exam findings, labs, and imaging discussed with urology staff surgeon Angel, who developed the treatment plan.    Halima  MD Dk  Urology Resident

## 2021-01-22 NOTE — ED PROVIDER NOTES
ED Provider Note  [facility]      History     Chief Complaint   Patient presents with     Groin Injury     Pt reports injury to penis and testicles from using conditioner instead of lotion last week, pt reports skin flakes and a lot of pain in groin, no penile discharge     HPI  Heber Wells is a 45 year old male who presents emergency room with this history of having used hair conditioner instead of lotion after having intercourse with his significant other.  Patient states that he had lotion on his arms and it became slightly red and then he noticed lotion on his penis and testicles causing increased erythema and irritation he showered but still had irritation in the area and the skin and begin to dry out he then began to have 2 or 3 areas where it was cracked and and has had increased pain since that time with continued redness and swelling.    Past Medical History  Past Medical History:   Diagnosis Date     Adjustment disorder with anxiety 2013     Moderate major depression (H) 2013     History reviewed. No pertinent surgical history.       cephALEXin (KEFLEX) 500 MG capsule       oxyCODONE-acetaminophen (PERCOCET) 5-325 MG tablet       buPROPion (WELLBUTRIN SR) 150 MG 12 hr tablet       sildenafil (VIAGRA) 100 MG tablet       traMADol (ULTRAM) 50 MG tablet      No Known Allergies  Family History  Family History   Problem Relation Age of Onset     Diabetes Father      Cancer Father         Leukemia     Cancer Maternal Grandfather         prostate     Heart Disease Sister         barillas     Social History   Social History     Tobacco Use     Smoking status: Former Smoker     Types: Cigarettes     Quit date: 2014     Years since quittin.6     Smokeless tobacco: Never Used   Substance Use Topics     Alcohol use: No     Alcohol/week: 0.0 standard drinks     Drug use: Yes     Types: Marijuana     Comment: occasionally      Past medical history, past surgical history,  medications, allergies, family history, and social history were reviewed with the patient. No additional pertinent items.       Review of Systems  A complete review of systems was performed with pertinent positives and negatives noted in the HPI, and all other systems negative.    Physical Exam   BP: (!) 142/92  Pulse: 101  Temp: 98.4  F (36.9  C)  Resp: 16  SpO2: 98 %  Physical Exam  Constitutional:       General: He is not in acute distress.     Appearance: He is not diaphoretic.   HENT:      Head: Atraumatic.   Eyes:      General: No scleral icterus.     Pupils: Pupils are equal, round, and reactive to light.   Cardiovascular:      Heart sounds: Normal heart sounds.   Pulmonary:      Effort: No respiratory distress.      Breath sounds: Normal breath sounds.   Abdominal:      General: Bowel sounds are normal.      Palpations: Abdomen is soft.      Tenderness: There is no abdominal tenderness.   Genitourinary:     Comments: Patient had erythematous swelling of both penis and testicles with scaling and drying of the skin likely reaction to an irritant.  There are at least 3 or 4 areas where the scaling and cracking has caused a small amount of bleeding.  Musculoskeletal:         General: No tenderness.   Skin:     General: Skin is warm.      Findings: No rash.         ED Course      Procedures         Patient was seen and examined by the urology resident as well who agreed that this is likely a chemical irritant causing irritation of the skin of both penis and testicles             Medications   oxyCODONE-acetaminophen (PERCOCET) 5-325 MG per tablet 2 tablet (2 tablets Oral Given 1/21/21 1734)        Assessments & Plan (with Medical Decision Making)           I have reviewed the nursing notes. I have reviewed the findings, diagnosis, plan and need for follow up with the patient.    Discharge Medication List as of 1/21/2021  6:05 PM      START taking these medications    Details   cephALEXin (KEFLEX) 500 MG capsule  Take 1 capsule (500 mg) by mouth 4 times daily for 7 days, Disp-28 capsule, R-0, Local Print      oxyCODONE-acetaminophen (PERCOCET) 5-325 MG tablet Take 1-2 tablets by mouth every 4 hours as needed for pain, Disp-8 tablet, R-0, Local Print             Patient with contact dermatitis inflammation likely secondary to chemical exposure on his penis and testicles patient be treated for possible underlying bacterial infection due to break in the skin and will also use Vaseline to keep the area moist and to follow-up with his parent if not improving or return if he has any marked increase in swelling or drainage        Final diagnoses:   Contact dermatitis, unspecified contact dermatitis type, unspecified trigger       --    [facility] EMERGENCY DEPARTMENT  1/21/2021     Enrique Motley MD  01/22/21 4449

## 2021-01-25 ENCOUNTER — NURSE TRIAGE (OUTPATIENT)
Dept: NURSING | Facility: CLINIC | Age: 46
End: 2021-01-25
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
